# Patient Record
(demographics unavailable — no encounter records)

---

## 2025-03-17 NOTE — REVIEW OF SYSTEMS
[Fever] : no fever [Chills] : no chills [Night Sweats] : no night sweats [Discharge] : no discharge [Pain] : no pain [Redness] : no redness [Vision Problems] : no vision problems [Chest Pain] : no chest pain [Palpitations] : no palpitations [Orthopena] : no orthopnea [Shortness Of Breath] : no shortness of breath [Wheezing] : no wheezing [Cough] : no cough [Abdominal Pain] : no abdominal pain [Nausea] : no nausea [Vomiting] : no vomiting [Heartburn] : no heartburn [Dysuria] : no dysuria [Incontinence] : no incontinence

## 2025-03-17 NOTE — PHYSICAL EXAM
[No Acute Distress] : no acute distress [Well Nourished] : well nourished [Well Developed] : well developed [No JVD] : no jugular venous distention [No Lymphadenopathy] : no lymphadenopathy [Normal Rate] : normal rate  [Regular Rhythm] : with a regular rhythm [Normal S1, S2] : normal S1 and S2 [No Respiratory Distress] : no respiratory distress  [No Accessory Muscle Use] : no accessory muscle use [Clear to Auscultation] : lungs were clear to auscultation bilaterally [No Edema] : there was no peripheral edema [No Extremity Clubbing/Cyanosis] : no extremity clubbing/cyanosis [Soft] : abdomen soft [Non Tender] : non-tender [Non-distended] : non-distended [No Spinal Tenderness] : no spinal tenderness

## 2025-03-17 NOTE — HISTORY OF PRESENT ILLNESS
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  Last appt 9/2024 had bloodwork done.  ROS negative, reports only suboptimal sleep as he works night shift and sometimes wakes up.  [FreeTextEntry1] : follow up, 6 months

## 2025-03-17 NOTE — ASSESSMENT
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  Last appt 9/2024 had bloodwork done and reviewed - A1c improved. LDL, CMP, CBC wnl.   #History of left atrial myxoma s/p resection #Mod MR ECHO 6/27/22 EF 66 % Mild MVR No known FHx heart disease, stroke, cancers.  -following with cardio, Dr Motley last visit 7/2024, next visit July or Aug of 2025  -TTE ~9/2025   #PreDM  Lifestyle managed. Father w hx DM ~late 40s, in context of poor diet.  A1c in 7/2024 was 5.8%, down to 5.6% in 9/2024  - Counseled on dietary and lifestyle changes. Does work out/goes to gym.  - Continue to monitor  #HCM  - flu vaccine: did not receive this year - repeat A1c and follow up in 6 months

## 2025-07-15 NOTE — PHYSICAL EXAM
[General Appearance - Well Developed] : well developed [Normal Appearance] : normal appearance [Well Groomed] : well groomed [General Appearance - Well Nourished] : well nourished [No Deformities] : no deformities [General Appearance - In No Acute Distress] : no acute distress [Normal Conjunctiva] : the conjunctiva exhibited no abnormalities [Eyelids - No Xanthelasma] : the eyelids demonstrated no xanthelasmas [] : no respiratory distress [Respiration, Rhythm And Depth] : normal respiratory rhythm and effort [Exaggerated Use Of Accessory Muscles For Inspiration] : no accessory muscle use [Auscultation Breath Sounds / Voice Sounds] : lungs were clear to auscultation bilaterally [Heart Rate And Rhythm] : heart rate and rhythm were normal [Heart Sounds] : normal S1 and S2 [Edema] : no peripheral edema present [Bowel Sounds] : normal bowel sounds [Abdomen Soft] : soft [Abdomen Tenderness] : non-tender [Abnormal Walk] : normal gait [Nail Clubbing] : no clubbing of the fingernails [Cyanosis, Localized] : no localized cyanosis [Skin Color & Pigmentation] : normal skin color and pigmentation [Skin Turgor] : normal skin turgor [Oriented To Time, Place, And Person] : oriented to person, place, and time [Affect] : the affect was normal [FreeTextEntry1] : no JVD

## 2025-07-15 NOTE — REVIEW OF SYSTEMS
[Negative] : Heme/Lymph [SOB] : no shortness of breath [Dyspnea on exertion] : not dyspnea during exertion [Chest Discomfort] : no chest discomfort [Leg Claudication] : no intermittent leg claudication [Palpitations] : no palpitations [Orthopnea] : no orthopnea [Syncope] : no syncope [FreeTextEntry5] : see HPI

## 2025-07-15 NOTE — HISTORY OF PRESENT ILLNESS
[FreeTextEntry1] : 26 y/o male, who was found to have a large left atrial myxoma and now is s/p resection by CTS. Recovered well  Moderate MR by last echo. No angina. No dyspnea.  No bleeding. No fever. No LE edema.  Had ER visit n February 2021 with palpitations - found to have sinus tachycardia. MCOT results noted. Had CTA, which was negative for PE.  S/p EP F.u on 10/24/22 . ECHO 6/27/22 EF 66 %  Mild MVR . No mass. Pt is active all day at work performs physical activity without any cardiac issue, denies palpitations, no chest pain. Pt works nights - feels tired.   09/09/24 HgbA1C 5.6 Chol 184 Trig 59 LDL 95 K 5.3 BUN 23/ cr 0.8

## 2025-07-15 NOTE — ASSESSMENT
[FreeTextEntry1] : Hospital records reviewed. Prior w/u, echo, CT scan, CXR reviewed. Repeat echo results noted. CT results noted. MCOT reviewed. EP note reviewed  F/u in 1 year. Discussed with the patient. Diet discussed. Prior echo reviewed, will repeat this year. Increase exertional tolerance as tolerated. Labs with PMD - scheduled for Sept. Primary prevention.  Return in 1 year